# Patient Record
Sex: MALE | Race: OTHER
[De-identification: names, ages, dates, MRNs, and addresses within clinical notes are randomized per-mention and may not be internally consistent; named-entity substitution may affect disease eponyms.]

---

## 2020-02-05 ENCOUNTER — HOSPITAL ENCOUNTER (EMERGENCY)
Dept: HOSPITAL 72 - EMR | Age: 30
Discharge: HOME | End: 2020-02-05
Payer: MEDICAID

## 2020-02-05 VITALS — WEIGHT: 180 LBS | HEIGHT: 66 IN | BODY MASS INDEX: 28.93 KG/M2

## 2020-02-05 VITALS — DIASTOLIC BLOOD PRESSURE: 79 MMHG | SYSTOLIC BLOOD PRESSURE: 129 MMHG

## 2020-02-05 DIAGNOSIS — K12.2: Primary | ICD-10-CM

## 2020-02-05 PROCEDURE — 99282 EMERGENCY DEPT VISIT SF MDM: CPT

## 2020-02-05 NOTE — NUR
ED Nurse Note:

Pt ambulated to ED from home c.o swollen uvula since this morning, denies fever 
or any other symptoms. VSS.

## 2023-10-05 NOTE — EMERGENCY ROOM REPORT
History of Present Illness


General


Chief Complaint:  Sore Throat


Source:  Patient





Present Illness


HPI


Patient presents with swelling of the uvula.  Started earlier today.  It caused 

him to gag and vomit once.  He tries to clear his throat.  He states it 

somewhat painful.  He rates the pain 7/10.  There is no bleeding.  He does not 

smoke.  He denies muscle aches, rashes, headache, nasal congestion, sinus pain, 

difficulty breathing.


Allergies:  


Coded Allergies:  


     No Known Allergies (Unverified , 2/5/20)





Patient History


Past Medical History:  see triage record


Social History:  Denies: smoking


Social History Narrative


With significant other


Reviewed Nursing Documentation:  PMH: Agreed; PSxH: Agreed





Nursing Documentation-PMH


Past Medical History:  No Stated History





Review of Systems


Constitutional:  Denies: fever, malaise


ENT:  Reports: see HPI


Respiratory:  Denies: shortness of breath


Cardiovascular:  Denies: chest pain


Gastrointestinal:  Reports: see HPI


Skin:  Denies: rash


Neurological:  Denies: headache





Physical Exam





Vital Signs








  Date Time  Temp Pulse Resp B/P (MAP) Pulse Ox O2 Delivery O2 Flow Rate FiO2


 


2/5/20 22:43 97.9 69 18 129/79 (96) 95 Room Air  








Sp02 EP Interpretation:  reviewed, normal


General Appearance:  well appearing, no apparent distress, GCS 15, non-toxic


Head:  normocephalic


Eyes:  bilateral eye normal inspection, bilateral eye PERRL, bilateral eye EOMI


ENT:  moist mucus membranes, other - Edematous uvula


Neck:  full range of motion, supple


Respiratory:  lungs clear, normal breath sounds


Cardiovascular #1:  regular rate, rhythm


Cardiovascular #2:  2+ radial (R)


Gastrointestinal:  normal inspection


Musculoskeletal:  gait/station normal


Neurologic:  alert, grossly normal


Psychiatric:  mood/affect normal


Skin:  no rash, warm/dry





Medical Decision Making


Diagnostic Impression:  


 Primary Impression:  


 Uvulitis


ER Course


Patient presents with a swollen uvula.  The diagnosis is clinical.  Treated 

with viscous lidocaine.  No respiratory compromise.  Patient able to swallow 

without difficulty.





Discussed findings with patient and treatment plan.





Patient stable for outpatient observation and treatment.





Last Vital Signs








  Date Time  Temp Pulse Resp B/P (MAP) Pulse Ox O2 Delivery O2 Flow Rate FiO2


 


2/5/20 23:50 97.9 69 18 129/79 95 Room Air  








Status:  improved


Disposition:  HOME, SELF-CARE


Condition:  Improved


Scripts


Lidocaine HCl 2% Viscous (Lidocaine HCl 2% Viscous) 100 Ml Solution


5 ML ORAL QID PRN for throat pain or irritation, #30 ML


   mix with water.  gargle and swallow


   Prov: Ra Miranda MD         2/5/20


Referrals:  


[facility],REFERRING (PCP)











Ra Miranda MD Feb 5, 2020 23:20
Hide Include Location In Plan Question?: No
Detail Level: Detailed